# Patient Record
Sex: MALE | Race: WHITE
[De-identification: names, ages, dates, MRNs, and addresses within clinical notes are randomized per-mention and may not be internally consistent; named-entity substitution may affect disease eponyms.]

---

## 2019-08-21 ENCOUNTER — HOSPITAL ENCOUNTER (OUTPATIENT)
Dept: HOSPITAL 47 - ORWHC2ENDO | Age: 54
Discharge: HOME | End: 2019-08-21
Attending: SURGERY
Payer: COMMERCIAL

## 2019-08-21 VITALS — SYSTOLIC BLOOD PRESSURE: 122 MMHG | DIASTOLIC BLOOD PRESSURE: 77 MMHG | RESPIRATION RATE: 20 BRPM | HEART RATE: 75 BPM

## 2019-08-21 VITALS — BODY MASS INDEX: 38.5 KG/M2

## 2019-08-21 VITALS — TEMPERATURE: 96.9 F

## 2019-08-21 DIAGNOSIS — Z87.891: ICD-10-CM

## 2019-08-21 DIAGNOSIS — Z88.6: ICD-10-CM

## 2019-08-21 DIAGNOSIS — K57.30: ICD-10-CM

## 2019-08-21 DIAGNOSIS — K64.8: Primary | ICD-10-CM

## 2019-08-21 DIAGNOSIS — E66.01: ICD-10-CM

## 2019-08-21 PROCEDURE — 45378 DIAGNOSTIC COLONOSCOPY: CPT

## 2019-08-21 NOTE — P.GSHP
History of Present Illness


H&P Date: 08/21/19


Chief Complaint: Internal and external hemorrhoids





This a 53-year-old male referred from Dr. Bullock.  Patient issues with 

hemorrhoids.  Patient complaints of anal pain, bleeding and itching.  He 

presents today for colonoscopy.





Past Medical History


Additional Past Medical History / Comment(s): hemmorhoids


History of Any Multi-Drug Resistant Organisms: None Reported


Additional Past Surgical History / Comment(s): hemmorhoidectomy, birthmark 

removed from shoulder, rt neck benign growth removed


Past Anesthesia/Blood Transfusion Reactions: Previous Problems w/ Anesthesia


Additional Past Anesthesia/Blood Transfusion Reaction / Comment(s): "very scared

of needles, can pass out"


Smoking Status: Former smoker





Medications and Allergies


                                Home Medications











 Medication  Instructions  Recorded  Confirmed  Type


 


Ibuprofen [Motrin Ib] 200 mg PO Q6H PRN 08/16/19 08/21/19 History








                                    Allergies











Allergy/AdvReac Type Severity Reaction Status Date / Time


 


aspirin Allergy  Swelling/li Verified 08/21/19 13:08





   ps  














Surgical - Exam


                                   Vital Signs











Temp Pulse Resp Pulse Ox


 


 96.9 F L  87   18   96 


 


 08/21/19 13:15  08/21/19 13:15  08/21/19 13:15  08/21/19 13:15














- General


well developed, well nourished, no distress





- Eyes


PERRL





- ENT


normal pinna





- Neck


no masses





- Respiratory


normal expansion





- Cardiovascular


Rhythm: regular





- Abdomen


Abdomen: soft, non tender





Assessment and Plan


Assessment: 





Anal pain, bleeding and itching





Hemorrhoids





We'll perform colonoscopy

## 2019-08-21 NOTE — P.OP
Date of Procedure: 08/21/19


Preoperative Diagnosis: 


Hemorrhoids


Postoperative Diagnosis: 


Internal hemorrhoids





Diverticulosis


Procedure(s) Performed: 


Colonoscopy


Anesthesia: MAC


Surgeon: Natan Daly


Pathology: none sent


Condition: stable


Disposition: PACU


Description of Procedure: 


The patient's placed on the endoscopy table lateral position.  He received IV 

sedation.  Digital rectal exam was performed which revealed internal 

hemorrhoids.  Flexible colonoscope was then placed patient anus and passed 

throughout the entire colon.  The ileocecal valve sutures.  The cecum, ascending

and transverse colon appeared normal.  In the descending and sigmoid colon there

was severe diverticulosis.  Scope was then brought back into the rectum and this

appeared normal.  Scope was withdrawn for patient and there is internal 

hemorrhoids noted.